# Patient Record
Sex: FEMALE | HISPANIC OR LATINO
[De-identification: names, ages, dates, MRNs, and addresses within clinical notes are randomized per-mention and may not be internally consistent; named-entity substitution may affect disease eponyms.]

---

## 2024-11-07 ENCOUNTER — DASHBOARD ENCOUNTERS (OUTPATIENT)
Age: 44
End: 2024-11-07

## 2024-11-07 ENCOUNTER — OFFICE VISIT (OUTPATIENT)
Dept: URBAN - METROPOLITAN AREA CLINIC 54 | Facility: CLINIC | Age: 44
End: 2024-11-07
Payer: COMMERCIAL

## 2024-11-07 VITALS
DIASTOLIC BLOOD PRESSURE: 81 MMHG | HEIGHT: 64 IN | SYSTOLIC BLOOD PRESSURE: 109 MMHG | BODY MASS INDEX: 28.34 KG/M2 | TEMPERATURE: 99 F | HEART RATE: 82 BPM | WEIGHT: 166 LBS

## 2024-11-07 DIAGNOSIS — K21.9 GASTROESOPHAGEAL REFLUX DISEASE, UNSPECIFIED WHETHER ESOPHAGITIS PRESENT: ICD-10-CM

## 2024-11-07 DIAGNOSIS — R19.4 CHANGE IN BOWEL HABITS: ICD-10-CM

## 2024-11-07 PROBLEM — 235595009: Status: ACTIVE | Noted: 2024-11-07

## 2024-11-07 PROCEDURE — 99204 OFFICE O/P NEW MOD 45 MIN: CPT

## 2024-11-07 PROCEDURE — 99244 OFF/OP CNSLTJ NEW/EST MOD 40: CPT

## 2024-11-07 RX ORDER — ESOMEPRAZOLE MAGNESIUM 20 MG/1
1 CAPSULE 1/2 TO 1 HOUR BEFORE MORNING MEAL CAPSULE, DELAYED RELEASE PELLETS ORAL ONCE A DAY
Qty: 90 CAPSULE | Refills: 3 | OUTPATIENT
Start: 2024-11-07

## 2024-11-07 RX ORDER — PHENTERMINE HYDROCHLORIDE 37.5 MG/1
1 TABLET BEFORE BREAKFAST TABLET ORAL ONCE A DAY
Status: ACTIVE | COMMUNITY

## 2024-11-07 RX ORDER — BUPROPION HYDROCHLORIDE 150 MG/1
1 TABLET IN THE MORNING TABLET, EXTENDED RELEASE ORAL ONCE A DAY
Status: ACTIVE | COMMUNITY

## 2024-11-07 RX ORDER — DARIDOREXANT 50 MG/1
1 TABLET WITHIN 30 MINUTES OF BEDTIME TABLET, FILM COATED ORAL ONCE A DAY
Status: ACTIVE | COMMUNITY

## 2024-11-07 NOTE — HPI-TODAY'S VISIT:
11/7/24: Patient is a 44 yo female with a PMH of HLD who was referred by Dr. Terry Aguila for diarrhea. A copy of this document will be sent to the provider. Pt states that a couple months ago she had 2-3 weeks of watery, mucusy diarrhea with up to 6 BMs/day. No hematochezia, melena, or steatorrhea. Some associated bloating and abd discomfort. Tried to get Creon but it wasn't covered, advised to f/u with GI. Did not have any stool testing done and denies hx of DM or pancreatitis. Pt was then put on semaglutide and diarrhea resolved. Now leans towards constipation which is well controlled with Miralax. Reports chronic heartburn/reflux that resolves with Nexium, but only takes for 2-3 days and stops, then symptoms recur. No prior EGD or cscope. No family history of colon cancer. Maternal grandmother had pancreatic cancer.